# Patient Record
Sex: MALE | Race: WHITE
[De-identification: names, ages, dates, MRNs, and addresses within clinical notes are randomized per-mention and may not be internally consistent; named-entity substitution may affect disease eponyms.]

---

## 2020-01-20 ENCOUNTER — HOSPITAL ENCOUNTER (INPATIENT)
Dept: HOSPITAL 92 - SDC | Age: 65
LOS: 2 days | Discharge: HOME | DRG: 909 | End: 2020-01-22
Attending: ORTHOPAEDIC SURGERY | Admitting: ORTHOPAEDIC SURGERY
Payer: COMMERCIAL

## 2020-01-20 VITALS — BODY MASS INDEX: 28.9 KG/M2

## 2020-01-20 DIAGNOSIS — T81.31XA: Primary | ICD-10-CM

## 2020-01-20 PROCEDURE — 36415 COLL VENOUS BLD VENIPUNCTURE: CPT

## 2020-01-20 PROCEDURE — 80048 BASIC METABOLIC PNL TOTAL CA: CPT

## 2020-01-20 PROCEDURE — 80202 ASSAY OF VANCOMYCIN: CPT

## 2020-01-20 PROCEDURE — S0020 INJECTION, BUPIVICAINE HYDRO: HCPCS

## 2020-01-21 LAB
ANION GAP SERPL CALC-SCNC: 11 MMOL/L (ref 10–20)
BUN SERPL-MCNC: 11 MG/DL (ref 8.4–25.7)
CALCIUM SERPL-MCNC: 8 MG/DL (ref 7.8–10.44)
CHLORIDE SERPL-SCNC: 104 MMOL/L (ref 98–107)
CO2 SERPL-SCNC: 24 MMOL/L (ref 23–31)
CREAT CL PREDICTED SERPL C-G-VRATE: 128 ML/MIN (ref 70–130)
GLUCOSE SERPL-MCNC: 181 MG/DL (ref 80–115)
POTASSIUM SERPL-SCNC: 4.1 MMOL/L (ref 3.5–5.1)
SODIUM SERPL-SCNC: 135 MMOL/L (ref 136–145)

## 2020-01-21 RX ADMIN — VANCOMYCIN HYDROCHLORIDE SCH MLS: 1 INJECTION, SOLUTION INTRAVENOUS at 13:35

## 2020-01-21 RX ADMIN — VANCOMYCIN HYDROCHLORIDE SCH MLS: 1 INJECTION, SOLUTION INTRAVENOUS at 06:31

## 2020-01-21 RX ADMIN — VANCOMYCIN HYDROCHLORIDE SCH MLS: 1 INJECTION, SOLUTION INTRAVENOUS at 22:02

## 2020-01-21 RX ADMIN — ASPIRIN SCH MG: 81 TABLET ORAL at 20:29

## 2020-01-21 RX ADMIN — ASPIRIN SCH MG: 81 TABLET ORAL at 08:15

## 2020-01-22 VITALS — DIASTOLIC BLOOD PRESSURE: 76 MMHG | SYSTOLIC BLOOD PRESSURE: 165 MMHG | TEMPERATURE: 97.3 F

## 2020-01-22 LAB — VANCOMYCIN TROUGH SERPL-MCNC: 11.7 UG/ML

## 2020-01-22 PROCEDURE — 0KBB0ZZ EXCISION OF LEFT LOWER ARM AND WRIST MUSCLE, OPEN APPROACH: ICD-10-PCS | Performed by: ORTHOPAEDIC SURGERY

## 2020-01-22 RX ADMIN — VANCOMYCIN HYDROCHLORIDE SCH MLS: 1 INJECTION, SOLUTION INTRAVENOUS at 06:13

## 2020-01-22 RX ADMIN — ASPIRIN SCH: 81 TABLET ORAL at 08:57

## 2020-01-22 NOTE — OP
DATE OF PROCEDURE:  01/22/2020



PREOPERATIVE DIAGNOSIS:  20 cm wound open after previous operative procedures

approximately 48 hours prior. 



POSTOPERATIVE DIAGNOSES:  

1. Wound 22 cm with no infection.

2. Arterial and neuroplasty sites intact.

3. Separation, flexor digitorum superficialis muscle mid tendon musculotendinous

from previous repair. 



PROCEDURES PERFORMED:  

1. Wound debridement, 61433, level 2.

2. Wound closure, 22 cm, complex.

3. Flexor digitorum superficialis repair, nearly at the musculotendinous junction,

application of long-arm splint. 



DESCRIPTION OF PROCEDURE:  After successful general endotracheal anesthesia, the

limb was prepped and draped.  The VAC dressing had been removed. 



We did not inflate the tourniquet, lifted up the wound edges, so one small speck of

debris underneath one subcutaneous skin flap, none deep.  We also could easily

inspect the ulnar artery repair at flexor digitorum superficialis tendon repair and

 at the musculotendinous junction, and we could visualize the nerve and

artery, and there was no infection or debris around the bed, and the repairs remain

intact as did the neural tube from the previous procedure. 



Now, we debrided the wound edges 1 mm circumferentially using the same technique

with different wound debridement. 

1. Excision technique.

2. Using instruments:  Tenotomy scissors, [place] blade, Adson, and the 1 L Pulsavac.

3. The depth was down to and include muscle bellies and tendinous junction and there

was no evidence of gross infection and only one small debride spot, so wound closure

was anticipated.  We then irrigated with 3 L normal saline Pulsavac pressure.  We

brought a 3-0 loop suture on the field and performed a __________ technique primary

repair with flexion established and the over-sew for paratenon stitches with 5-0

Prolene. 

We then placed a drain Hemovac and brought out through a separate stab wound for

closing the dermis with a running 3-0 Monocryl and epidermis with 3-0 nylon in

interrupted mattress pattern.  The patient had been given before the procedure and

began circumferentially 30 mL of 0.5% Marcaine block in preparation for discharge.

A long-arm splint was applied with the drain visible for removal and the wrist at 30

degrees of flexion, the MP joints at 85 degrees of flexion, and PIPs at 35 degrees

of flexion. 







Job ID:  [id number]

## 2020-01-23 NOTE — OP
DATE OF PROCEDURE:  01/20/2020



COMPLICATIONS:  None.



TOURNIQUET TIME:  50 minutes.



ESTIMATED BLOOD LOSS:  150 mL.



PREOPERATIVE DIAGNOSES:  

1. Wound with tendon involvement, approximately 15 cm.

2. Flexor digitorum superficialis laceration to all tendons.

3. Flexor carpi ulnaris laceration, complete.

4. Palmaris longus laceration, all at the musculotendinous junction of the palmaris,

which has still had 2 cm of tendon still proximal to the laceration. 

5. Ulnar nerve sheath injury without defect or separation.

6. Ulnar artery laceration, complete.



PROCEDURES PERFORMED:  

1. Debridement of wound.

2. Flexor digitorum superficialis repair, index finger.

3. Flexor digitorum superficialis repair, long finger.

4. Flexor digitorum superficialis repair, ring finger.

5. Flexor digitorum superficialis repair, small finger.

6. Palmaris longus repair.

7. Flexor carpi ulnaris repair.

8. Microscopic ulnar nerve neuroplasty, mid-proximal third forearm level.

9. Ulnar artery repair, mid-proximal third forearm level, microscopic.

10. Application of long-arm splint. 

Again, all these are zone V lacerations.



INDICATION:  The patient had a round injury to his hand on the night of admission.

He was brought to the operating room because of copious bleeding indicative of where

the incision was located in the ulnar artery.  This should not be made to wait till

next morning. 



DESCRIPTION OF PROCEDURE:  The patient was taken to the operating room.

Immediately, before going to operating room, he underwent prepping, draping, and

counseling of he and his family.  Once prepped and draped, a sterile tourniquet was

applied and we inflated the tourniquet after exsanguination of the limb to 250 mmHg

pressure.  We extended this incision approximately 4 cm proximal and 4 cm distal, so

we could expose the entire injury zone down to the area of all good tendon not

injured.  We then inspected and found a copious hematoma, complete laceration of

palmaris longus, flexor carpi ulnaris, and flexor digitorum superficialis, all 4

just distal to the musculotendinous junction, leaving tendon on both sides for

repair.  We then brought tenotomy scissors, [place] blade, 11-blade knife, Smith

scissors, Adson's, intermediate-size Rakes, and 5 L of Pulsavac as our equipment for

debridement.  Debridement was excisional and down to include neurovascular bundle.

We could also visualize the bone on inspection to make sure no debris __________

into an area of concern.  This was done to make sure debridement completely removed

at least 10 individual specks of particles that were plastic or hard in nature.

Final irrigation seemed to clear these and even on the magnification with loupes and

we decided to approach tendon repair, but not closed the wound that day. 



We brought the musculotendinous junction of the flexor carpi ulnaris into repair

with interrupted figure-of-eight sutures, multiple with #1 Ethibond on OS-4 needle.

Then, we performed similar repair to the musculotendinous junction of all the flexor

digitorum superficialis and had excellent tension at the end of procedure and this

was maintained.  Finally, we used a Prolene, multiple [id number] into the

palmaris longus. 



Once all of this was complete, we then inspected the wound, placed the white sponge

cut out to fit the wound, black sponge cut out to fit the wound and on top of this

with the VAC suction applied and ready to work.  We placed the suction tube in.

Once the suction tube was in place, then the VAC had excellent suction and we

prepared the patient for this. 



After tendon repairs, then the microscope was brought into the field and performed a

neuroplasty throughout the entire available field, that is 5 cm on either side of

the ulnar nerve at this juncture and we saw a small sheath irritation, which we

debrided under microscope but no true lacerations.  We then saw, however, a two-hole

segmental laceration of the ulnar artery, so with microscope intact, we released the

tourniquet, placed clamps on either side to control the bleeding, used the Terell

solution to make sure we removed the hematoma, and used an 8-0 nylon under

microscope to repair the artery.  We released it from both sides with not only good

flow, but there was no posturing or other abnormality with the flow, so we knew we

had a good repair.  We then released the retraction in the muscles, obtained

hemostasis subcutaneously, and then placed a VAC dressing with a white sponge cut

out to fit the wound and black sponge slightly less in diameter and next sponge on

top of the white. 







Job ID:  [id number]